# Patient Record
Sex: MALE | Race: OTHER | HISPANIC OR LATINO | URBAN - METROPOLITAN AREA
[De-identification: names, ages, dates, MRNs, and addresses within clinical notes are randomized per-mention and may not be internally consistent; named-entity substitution may affect disease eponyms.]

---

## 2024-09-04 ENCOUNTER — EMERGENCY (EMERGENCY)
Facility: HOSPITAL | Age: 24
LOS: 1 days | End: 2024-09-04
Attending: STUDENT IN AN ORGANIZED HEALTH CARE EDUCATION/TRAINING PROGRAM
Payer: SELF-PAY

## 2024-09-04 VITALS
RESPIRATION RATE: 18 BRPM | HEART RATE: 77 BPM | WEIGHT: 145.06 LBS | SYSTOLIC BLOOD PRESSURE: 151 MMHG | TEMPERATURE: 98 F | DIASTOLIC BLOOD PRESSURE: 74 MMHG | OXYGEN SATURATION: 98 %

## 2024-09-04 PROCEDURE — 99283 EMERGENCY DEPT VISIT LOW MDM: CPT

## 2024-09-04 PROCEDURE — 99053 MED SERV 10PM-8AM 24 HR FAC: CPT

## 2024-09-04 PROCEDURE — 71101 X-RAY EXAM UNILAT RIBS/CHEST: CPT | Mod: 26

## 2024-09-04 PROCEDURE — T1013: CPT

## 2024-09-04 PROCEDURE — 99284 EMERGENCY DEPT VISIT MOD MDM: CPT

## 2024-09-04 PROCEDURE — 71101 X-RAY EXAM UNILAT RIBS/CHEST: CPT

## 2024-09-04 RX ORDER — METHOCARBAMOL 750 MG/1
1 TABLET, FILM COATED ORAL
Qty: 15 | Refills: 0
Start: 2024-09-04

## 2024-09-04 RX ORDER — LIDOCAINE/BENZALKONIUM/ALCOHOL
1 SOLUTION, NON-ORAL TOPICAL ONCE
Refills: 0 | Status: COMPLETED | OUTPATIENT
Start: 2024-09-04 | End: 2024-09-04

## 2024-09-04 RX ORDER — IBUPROFEN 600 MG
600 TABLET ORAL ONCE
Refills: 0 | Status: COMPLETED | OUTPATIENT
Start: 2024-09-04 | End: 2024-09-04

## 2024-09-04 RX ORDER — METHOCARBAMOL 750 MG/1
1500 TABLET, FILM COATED ORAL ONCE
Refills: 0 | Status: COMPLETED | OUTPATIENT
Start: 2024-09-04 | End: 2024-09-04

## 2024-09-04 RX ORDER — IBUPROFEN 600 MG
1 TABLET ORAL
Qty: 20 | Refills: 0
Start: 2024-09-04

## 2024-09-04 RX ADMIN — METHOCARBAMOL 1500 MILLIGRAM(S): 750 TABLET, FILM COATED ORAL at 02:11

## 2024-09-04 RX ADMIN — Medication 600 MILLIGRAM(S): at 02:10

## 2024-09-04 RX ADMIN — Medication 1 PATCH: at 02:11
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
## 2024-09-04 NOTE — ED PROVIDER NOTE - PATIENT PORTAL LINK FT
You can access the FollowMyHealth Patient Portal offered by [facility] by registering at the following website: [email or web address]. By joining [employer] (ac[employer] by [employer] in 2014)’s FollowMyHealth portal, you will also be able to view your health information using other applications (apps) compatible with our system.
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
## 2024-09-04 NOTE — ED PROVIDER NOTE - PHYSICAL EXAMINATION
Gen: No acute distress, non toxic  HEENT: Mucous membranes moist, pink conjunctivae, PERRL, EOMI  CV: RRR, nl s1/s2.  Resp: CTAB, normal rate and effort  GI: Abdomen soft, non-tender to palpation, no rebound or guarding   : No CVAT  Neuro: A&O x 3, CNII-XII grossly intact, moving all 4 extremities  MSK: +Tenderness right lower anterior/lateral ribs. No abdominal tenderness. No midline spine tenderness to palpation. +Bilateral cervical paraspinal tenderness. 5/5 strength BUE/BLE, sensation intact throughout, 2+ distal pulses  Skin: No rashes. intact and perfused.

## 2025-01-19 ENCOUNTER — EMERGENCY (EMERGENCY)
Facility: HOSPITAL | Age: 25
LOS: 1 days | Discharge: HOME | End: 2025-01-19
Attending: STUDENT IN AN ORGANIZED HEALTH CARE EDUCATION/TRAINING PROGRAM
Payer: MEDICAID

## 2025-01-19 VITALS
HEART RATE: 107 BPM | RESPIRATION RATE: 18 BRPM | OXYGEN SATURATION: 97 % | WEIGHT: 149.91 LBS | DIASTOLIC BLOOD PRESSURE: 93 MMHG | SYSTOLIC BLOOD PRESSURE: 140 MMHG | TEMPERATURE: 98 F

## 2025-01-19 LAB — PCP SPEC-MCNC: SIGNIFICANT CHANGE UP

## 2025-01-19 PROCEDURE — 99285 EMERGENCY DEPT VISIT HI MDM: CPT

## 2025-01-19 PROCEDURE — 93010 ELECTROCARDIOGRAM REPORT: CPT

## 2025-01-19 RX ORDER — LORAZEPAM 1 MG/1
2 TABLET ORAL ONCE
Refills: 0 | Status: DISCONTINUED | OUTPATIENT
Start: 2025-01-19 | End: 2025-01-19

## 2025-01-19 RX ADMIN — LORAZEPAM 2 MILLIGRAM(S): 1 TABLET ORAL at 23:17

## 2025-01-19 NOTE — ED PROVIDER NOTE - CLINICAL SUMMARY MEDICAL DECISION MAKING FREE TEXT BOX
24yo M w/ cocaine use disorder presents to the ED for hallucinations, tachycardic, denies any homicidal or any suicidal ideation. will do medical screening labs and request one-to-one. +ativan 26yo M w/ cocaine use disorder presents to the ED for hallucinations, tachycardic, denies any homicidal or any suicidal ideation. will do medical screening labs and request one-to-one. +ativan    Patient received on sign out - seen and evaluated by telepsych.  Cleared for outpatient follow-up. Ramona Parisi MD Attending Physician-

## 2025-01-19 NOTE — ED ADULT TRIAGE NOTE - CHIEF COMPLAINT QUOTE
c/o of visual hallucinations. Denies SI/HI, auditory hallucinations, etoh/drug use. Spouse at bedside. Patient changed into yellow gown

## 2025-01-19 NOTE — ED PROVIDER NOTE - PHYSICAL EXAMINATION
Gen: well appearing, sitting up in bed, cooperative, +walking into other patient rooms during interview  Head: normocephalic, atraumatic  EENT: EOMI, moist mucous membranes, no scleral icterus, no JVD  Lung: no increased work of breathing, clear to auscultation bilaterally, no wheezing, rales, rhonchi, speaking in full sentences  CV: +tachycardic, regular rhythm, normal s1/s2, 2+ radial pulses bilaterally  Abd: soft, non-tender, non-distended,  no CVA tenderness  MSK: No edema, no visible deformities, full range of motion in all 4 extremities  Neuro: Awake, alert, no focal neurologic deficits  Skin: No obvious rash, no jaundice  Psych: normal affect, +concerned that people don't believe there are dead people hanging from backyard trees, "I have proof on my phone",

## 2025-01-19 NOTE — ED PROVIDER NOTE - PATIENT PORTAL LINK FT
You can access the FollowMyHealth Patient Portal offered by [facility] by registering at the following website: [email or web address]. By joining [employer]’s FollowMyHealth portal, you will also be able to view your health information using other applications (apps) compatible with our system.

## 2025-01-19 NOTE — ED PROVIDER NOTE - NSFOLLOWUPINSTRUCTIONS_ED_ALL_ED_FT
Physician Progress Note      Sasha Huerta  [facility] #:                  [id number]  :                       1960  ADMIT DATE:       2022 3:59 PM  [address] [place] DATE:        2022 8:30 PM  RESPONDING  PROVIDER #:        Mraia M Morin MD          QUERY TEXT:    Patient admitted  with seizures. Noted documentation of intubation for   airway protection in PNs ,  and  DC Summary and respiratory   failure in  Hospitalist PN and  Neurology PN. Please indicate one of   the following and document in the medical record: The medical record reflects the following:  Risk Factors: 64 y.o. male with seizures, CKD4, AMS, acidosis, DM, Opiod   dependence  Clinical Indicators: per  ED: Effort: Pulmonary effort is normal. No   respiratory distress. Due to concern for grossly altered mental status I did   intubate him for airway protection. Treatment: Intubation  Options provided:  -- Intubated for airway protection only, Acute Respiratory Failure ruled out   after study  -- Intubated for Acute Respiratory Failure as evidenced by, Please document   evidence. -- Other - I will add my own diagnosis  -- Disagree - Not applicable / Not valid  -- Disagree - Clinically unable to determine / Unknown  -- Refer to Clinical Documentation Reviewer    PROVIDER RESPONSE TEXT:    This patient was intubated for airway protection only, Acute Respiratory   Failure has been ruled out after study.     Query created by: Iain Muñoz on 2022 11:44 AM      Electronically signed by:  Maria M Morin MD 2022 1:52 PM Please follow-up with the resources provided to you by psychiatry.  Please return to the emergency department for hearing or seeing things that others are not, thoughts of harming yourself or others or if you get worse in any way

## 2025-01-19 NOTE — ED PROVIDER NOTE - ATTENDING CONTRIBUTION TO CARE
anxiety and hallucinations in setting of alcohol and cocaine abuse; anxious and restless, but cooperative; NAD, non toxic; normal heart and lung sounds; abd soft nt nd; no edema; +endorsing hallucinations; received ativan in ED; pending final BH eval    I personally saw the patient with the resident, and completed the key components of the history and physical exam. I then discussed the management plan with the resident.
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
## 2025-01-20 VITALS
DIASTOLIC BLOOD PRESSURE: 59 MMHG | OXYGEN SATURATION: 96 % | RESPIRATION RATE: 18 BRPM | TEMPERATURE: 98 F | HEART RATE: 93 BPM | SYSTOLIC BLOOD PRESSURE: 108 MMHG

## 2025-01-20 DIAGNOSIS — F15.951 OTHER STIMULANT USE, UNSPECIFIED WITH STIMULANT-INDUCED PSYCHOTIC DISORDER WITH HALLUCINATIONS: ICD-10-CM

## 2025-01-20 LAB
ALBUMIN SERPL ELPH-MCNC: 4.8 G/DL — SIGNIFICANT CHANGE UP (ref 3.3–5.2)
ALP SERPL-CCNC: 65 U/L — SIGNIFICANT CHANGE UP (ref 40–120)
ALT FLD-CCNC: 189 U/L — HIGH
AMPHET UR-MCNC: POSITIVE
ANION GAP SERPL CALC-SCNC: 19 MMOL/L — HIGH (ref 5–17)
APAP SERPL-MCNC: <3 UG/ML — LOW (ref 10–26)
APPEARANCE UR: CLEAR — SIGNIFICANT CHANGE UP
AST SERPL-CCNC: 55 U/L — HIGH
BARBITURATES UR SCN-MCNC: NEGATIVE — SIGNIFICANT CHANGE UP
BASOPHILS # BLD AUTO: 0.14 K/UL — SIGNIFICANT CHANGE UP (ref 0–0.2)
BASOPHILS NFR BLD AUTO: 0.9 % — SIGNIFICANT CHANGE UP (ref 0–2)
BENZODIAZ UR-MCNC: NEGATIVE — SIGNIFICANT CHANGE UP
BILIRUB SERPL-MCNC: 0.7 MG/DL — SIGNIFICANT CHANGE UP (ref 0.4–2)
BILIRUB UR-MCNC: NEGATIVE — SIGNIFICANT CHANGE UP
BUN SERPL-MCNC: 10.9 MG/DL — SIGNIFICANT CHANGE UP (ref 8–20)
CALCIUM SERPL-MCNC: 10.2 MG/DL — SIGNIFICANT CHANGE UP (ref 8.4–10.5)
CHLORIDE SERPL-SCNC: 96 MMOL/L — SIGNIFICANT CHANGE UP (ref 96–108)
CO2 SERPL-SCNC: 22 MMOL/L — SIGNIFICANT CHANGE UP (ref 22–29)
COCAINE METAB.OTHER UR-MCNC: NEGATIVE — SIGNIFICANT CHANGE UP
COLOR SPEC: YELLOW — SIGNIFICANT CHANGE UP
CREAT SERPL-MCNC: 1 MG/DL — SIGNIFICANT CHANGE UP (ref 0.5–1.3)
DIFF PNL FLD: NEGATIVE — SIGNIFICANT CHANGE UP
EGFR: 107 ML/MIN/1.73M2 — SIGNIFICANT CHANGE UP
EOSINOPHIL # BLD AUTO: 0.14 K/UL — SIGNIFICANT CHANGE UP (ref 0–0.5)
EOSINOPHIL NFR BLD AUTO: 0.9 % — SIGNIFICANT CHANGE UP (ref 0–6)
ETHANOL SERPL-MCNC: <10 MG/DL — SIGNIFICANT CHANGE UP (ref 0–9)
FENTANYL UR QL SCN: NEGATIVE — SIGNIFICANT CHANGE UP
GLUCOSE SERPL-MCNC: 92 MG/DL — SIGNIFICANT CHANGE UP (ref 70–99)
GLUCOSE UR QL: NEGATIVE MG/DL — SIGNIFICANT CHANGE UP
HCT VFR BLD CALC: 46.4 % — SIGNIFICANT CHANGE UP (ref 39–50)
HGB BLD-MCNC: 16.2 G/DL — SIGNIFICANT CHANGE UP (ref 13–17)
KETONES UR-MCNC: NEGATIVE MG/DL — SIGNIFICANT CHANGE UP
LEUKOCYTE ESTERASE UR-ACNC: NEGATIVE — SIGNIFICANT CHANGE UP
LYMPHOCYTES # BLD AUTO: 1.92 K/UL — SIGNIFICANT CHANGE UP (ref 1–3.3)
LYMPHOCYTES # BLD AUTO: 12.5 % — LOW (ref 13–44)
MANUAL SMEAR VERIFICATION: SIGNIFICANT CHANGE UP
MCHC RBC-ENTMCNC: 30.9 PG — SIGNIFICANT CHANGE UP (ref 27–34)
MCHC RBC-ENTMCNC: 34.9 G/DL — SIGNIFICANT CHANGE UP (ref 32–36)
MCV RBC AUTO: 88.4 FL — SIGNIFICANT CHANGE UP (ref 80–100)
METHADONE UR-MCNC: NEGATIVE — SIGNIFICANT CHANGE UP
MONOCYTES # BLD AUTO: 1.09 K/UL — HIGH (ref 0–0.9)
MONOCYTES NFR BLD AUTO: 7.1 % — SIGNIFICANT CHANGE UP (ref 2–14)
NEUTROPHILS # BLD AUTO: 11.4 K/UL — HIGH (ref 1.8–7.4)
NEUTROPHILS NFR BLD AUTO: 74.1 % — SIGNIFICANT CHANGE UP (ref 43–77)
NITRITE UR-MCNC: NEGATIVE — SIGNIFICANT CHANGE UP
OPIATES UR-MCNC: NEGATIVE — SIGNIFICANT CHANGE UP
PCP UR-MCNC: NEGATIVE — SIGNIFICANT CHANGE UP
PH UR: 6.5 — SIGNIFICANT CHANGE UP (ref 5–8)
PLAT MORPH BLD: NORMAL — SIGNIFICANT CHANGE UP
PLATELET # BLD AUTO: 304 K/UL — SIGNIFICANT CHANGE UP (ref 150–400)
POTASSIUM SERPL-MCNC: 3.4 MMOL/L — LOW (ref 3.5–5.3)
POTASSIUM SERPL-SCNC: 3.4 MMOL/L — LOW (ref 3.5–5.3)
PROT SERPL-MCNC: 7.7 G/DL — SIGNIFICANT CHANGE UP (ref 6.6–8.7)
PROT UR-MCNC: NEGATIVE MG/DL — SIGNIFICANT CHANGE UP
RBC # BLD: 5.25 M/UL — SIGNIFICANT CHANGE UP (ref 4.2–5.8)
RBC # FLD: 12.5 % — SIGNIFICANT CHANGE UP (ref 10.3–14.5)
RBC BLD AUTO: NORMAL — SIGNIFICANT CHANGE UP
SALICYLATES SERPL-MCNC: <0.6 MG/DL — LOW (ref 10–20)
SARS-COV-2 RNA SPEC QL NAA+PROBE: SIGNIFICANT CHANGE UP
SODIUM SERPL-SCNC: 137 MMOL/L — SIGNIFICANT CHANGE UP (ref 135–145)
SP GR SPEC: <1.005 — LOW (ref 1–1.03)
THC UR QL: NEGATIVE — SIGNIFICANT CHANGE UP
UROBILINOGEN FLD QL: 0.2 MG/DL — SIGNIFICANT CHANGE UP (ref 0.2–1)
VARIANT LYMPHS # BLD: 4.5 % — SIGNIFICANT CHANGE UP (ref 0–6)
WBC # BLD: 15.39 K/UL — HIGH (ref 3.8–10.5)
WBC # FLD AUTO: 15.39 K/UL — HIGH (ref 3.8–10.5)

## 2025-01-20 PROCEDURE — 99285 EMERGENCY DEPT VISIT HI MDM: CPT | Mod: 25

## 2025-01-20 PROCEDURE — 96372 THER/PROPH/DIAG INJ SC/IM: CPT

## 2025-01-20 PROCEDURE — T1013: CPT

## 2025-01-20 PROCEDURE — 90792 PSYCH DIAG EVAL W/MED SRVCS: CPT | Mod: 95

## 2025-01-20 PROCEDURE — 36415 COLL VENOUS BLD VENIPUNCTURE: CPT

## 2025-01-20 PROCEDURE — 80053 COMPREHEN METABOLIC PANEL: CPT

## 2025-01-20 PROCEDURE — 70450 CT HEAD/BRAIN W/O DYE: CPT | Mod: 26

## 2025-01-20 PROCEDURE — 87635 SARS-COV-2 COVID-19 AMP PRB: CPT

## 2025-01-20 PROCEDURE — 85025 COMPLETE CBC W/AUTO DIFF WBC: CPT

## 2025-01-20 PROCEDURE — 70450 CT HEAD/BRAIN W/O DYE: CPT | Mod: MC

## 2025-01-20 PROCEDURE — 93005 ELECTROCARDIOGRAM TRACING: CPT

## 2025-01-20 PROCEDURE — 80307 DRUG TEST PRSMV CHEM ANLYZR: CPT

## 2025-01-20 PROCEDURE — 81003 URINALYSIS AUTO W/O SCOPE: CPT

## 2025-01-20 RX ORDER — LORAZEPAM 1 MG/1
2 TABLET ORAL ONCE
Refills: 0 | Status: DISCONTINUED | OUTPATIENT
Start: 2025-01-20 | End: 2025-01-20

## 2025-01-20 RX ORDER — OLANZAPINE 15 MG/1
10 TABLET ORAL ONCE
Refills: 0 | Status: COMPLETED | OUTPATIENT
Start: 2025-01-20 | End: 2025-01-20

## 2025-01-20 RX ADMIN — LORAZEPAM 2 MILLIGRAM(S): 1 TABLET ORAL at 07:01

## 2025-01-20 RX ADMIN — OLANZAPINE 10 MILLIGRAM(S): 15 TABLET ORAL at 08:16

## 2025-01-20 NOTE — ED ADULT NURSE NOTE - OBJECTIVE STATEMENT
pt A & Ox4 brought in by family for visual hallucinations. Respirations even and unlabored. Denies chest pain or SOB. Pt reports hx of using cocaine, denies use today. Pt states he saw "dead people hanging from trees," believes people are after his wife and is actively hallucinating in ED. Pt in yellow gown, belongings secured, 1:1 in place, family at bedside. awaiting CT results for medical clearance.

## 2025-01-20 NOTE — ED BEHAVIORAL HEALTH ASSESSMENT NOTE - RISK ASSESSMENT
acute: utox +meth , depressed mood, unemployment  chroninc: cocaine use , etoh use  modifiable: intoxicant washout, medication management   protective: sense of responsibility to wife

## 2025-01-20 NOTE — ED BEHAVIORAL HEALTH PROGRESS NOTE - DETAILS
pt qasim prior and current sI/HI  he was provided psychoeducation on substance use and psychosis   he was advised to abstain from substances which can worsen his mental health   he understands can return to ED and/or utilize crisis hotlines should his symptoms worsen or return  Collteral was provided by father

## 2025-01-20 NOTE — ED BEHAVIORAL HEALTH PROGRESS NOTE - SUMMARY
Pt presented with psychosis-- AVH, paranoia and anxiety. He was RTIS and complained for insomnia x 3 days.

## 2025-01-20 NOTE — ED BEHAVIORAL HEALTH PROGRESS NOTE - INTERDISCIPLINARY MEETING HELD DETAILS FREE TEXT
Nursing Staff-- reprotes that pt has been calm and cooeprative throughout his period of pbsetvation. today he did not appear to be paranoid or delusional, RTIS.

## 2025-01-20 NOTE — ED BEHAVIORAL HEALTH PROGRESS NOTE - SAFETY PLAN ADDT'L DETAILS
Safety plan discussed with.../Education provided regarding environmental safety / lethal means restriction/Provision of National Suicide Prevention Lifeline 1-794-917-VAZH (8274)

## 2025-01-20 NOTE — ED BEHAVIORAL HEALTH NOTE - BEHAVIORAL HEALTH NOTE
========================     FOR EACH COLLATERAL     ========================     Collateral below has requested that the information provided remain confidential: Yes [  ] No [ x ]     Collateral below has provided information that patient is/may be unaware of: Yes [  ] No [  x]     Patient gives permission to obtain collateral from _____:     (  ) Yes     (x )  No     Rationale for overriding objection               (  ) Lack of capacity. Details: ________               ( x ) Assessing risk of danger to self/others. Details: ________     Rationale for selecting specific collateral source               ( x ) Potential to impact risk of danger to self/others and no alternative equivalent. Details: _____     NAME: Fabio      NUMBER: [phone]     RELATIONSHIP: Father      RELIABILITY: Reliable      COMMENTS: This writer contacted pt’s father, Fabio, to obtain collateral regarding patient's current presentation and relevant hx. Utilized  ID [id number] to facilitate collateral call.         Collateral does not report any acute safety concerns – no SI/SIB/SA current or past. Collateral interested in outpatient care if patient was to be psychiatrically cleared. Collateral expressed concern for the paranoid behaviors and the hallucinations, however denied any safety concerns.      ========================     PATIENT DEMOGRAPHICS:     ========================     HPI     BASELINE FUNCTIONING: Patient is a 25 y.o male, unemployed, domiciled with his wife (Aysha [phone]) brought into the ED after endorsing VH of dead bodies hanging from the trees.      DATE HPI STARTED: 1/19/2025      DECOMPENSATION: Per collateral, patient has no hx of AH/VH or SI/SIB/SA. Collateral reports that patient was expressing seeing dead bodies hanging from the trees at his home, and was also endorsing paranoid thoughts that he was being followed. Collateral reports this behavior was sudden and he has no hx of any similar episodes     SUICIDALITY: denied any past or current SI/SIB/SA     VIOLENCE: unknown      SUBSTANCE: Collateral reports pt has hx of using cocaine, however, states pt has not used cocaine since December. Collateral reports occasionally drinking, however, reports patient was at a party and was drinking alcohol prior to the onset of the VH      ========================     PAST PSYCHIATRIC HISTORY     ========================     DATE PAST PSYCHIATRIC HISTORY STARTED: no past psych hx      MAIN PSYCHIATRIC DIAGNOSIS: denied      PSYCHIATRIC HOSPITALIZATIONS: denied     PRIOR ILLNESS: denied     SUICIDALITY: denied     VIOLENCE: unknown      SUBSTANCE USE: history of cocaine use – collateral reports patient last used cocaine in December. Started using around 3 years ago      ==============     OTHER HISTORY     ==============     CURRENT MEDICATION: none      MEDICAL HISTORY: unknown      ALLERGIES unknown     LEGAL ISSUES: unknown     FIREARM ACCESS: denied      SOCIAL HISTORY: unknown     FAMILY HISTORY: unknown     DEVELOPMENTAL HISTORY:     unknown

## 2025-01-20 NOTE — ED BEHAVIORAL HEALTH PROGRESS NOTE - RISK ASSESSMENT
Pt  has not prior MH history or prior history of psychosis.  With rest and the continued resolution of his psychosis, he is currently back to his baseline level of risk, which is elevated above that of the general population due to his chronic cocaine use.      acute: recent episode of psychosis, low mod and anxiety   chronic: cocaine use   modifiable: employment, outpatient services   protective: family support, future orientation, openness to referrals/services

## 2025-01-20 NOTE — ED ADULT NURSE REASSESSMENT NOTE - NS ED NURSE REASSESS COMMENT FT1
pt is self isolating to his room resting/sleeping. No attempts to harm self or others. no acute distress noted.

## 2025-01-20 NOTE — ED BEHAVIORAL HEALTH PROGRESS NOTE - RISK MITIGATION STRATEGIES IMPLEMENTED DETAILS FREE TEXT
Discussed risk mitigation with patient , which includes abstaining from substances which may make his psychosis return: meth, cocaine , etoh ect.   Discussed addressing depression/anxiety and ongoing psychosocial stressors by folowing up on referrals and pursuing outpatient treatment  Education pt about returning to ED with continued or worsening symptoms   Educated pt about crisis hotlines

## 2025-01-20 NOTE — ED ADULT NURSE REASSESSMENT NOTE - NS ED NURSE REASSESS COMMENT FT1
pt continues to self isolate to his room with no attempts to harm self or others. awaiting to be re-evaluated.
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
## 2025-01-20 NOTE — ED BEHAVIORAL HEALTH PROGRESS NOTE - LACKING INFO FOR PSYCH DISPO DETAILS FREE TEXT
Pt presented with utox +ampehatamine and new onset AVH, paranoia . On reassessment he denies AVH, he does not appear to be RTIS or lookig suspiciously out the door as he was yesterday.

## 2025-01-20 NOTE — ED ADULT NURSE REASSESSMENT NOTE - DESCRIPTION
[facility] pt is ao Citizen of the [place] speaking brought in by father.  pt deies pmh, pph, denies drug use.  pt is having visual hallucinations.  while sitting on bed in room looking out stating he sees kids.  not seen by writer. telepsych called in consult started

## 2025-01-20 NOTE — ED BEHAVIORAL HEALTH PROGRESS NOTE - DETAILS:
Pt reports feeling much better today than he did yesterday. He denies  SI/HI and AVH. He says he was able to sleep well which has helped to improve his mood. He wants to go home.  Pt reports some anxiety about the AVH threatening his wife and is wondering about the pictures he took of the werewolves and people haning on trees in his phone, however he understands these experiences were likely drug induced and He has some insight to his psychosis yesterday being "just thoughts or illusions."  He understand  he can return to the ED if he experiences these disturbances again.  He feels safe to return home.
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
## 2025-01-20 NOTE — ED BEHAVIORAL HEALTH ASSESSMENT NOTE - NSPRESENTSXS_PSY_ALL_CORE
Depressed mood/Anhedonia/Psychosis/Hopelessness or despair/Global insomnia/Severe anxiety, agitation or panic

## 2025-01-20 NOTE — ED BEHAVIORAL HEALTH PROGRESS NOTE - CASE SUMMARY/FORMULATION (CLEARLY DOCUMENT RATIONALE FOR DISPOSITION CHANGE)
Pt is a 24 yo Male with a history of cocaine use that presented to ED with family due to paranoia, psychosis, anxiety and insomnia in the setting of his utox +meth. Pt was allowed time to for intoxicants to metabolize and provided with antipsychotics and benzo to help with psychosis, anxiety and sleep. Pt now reporting resolution of AVH. He does not appear to be floridly manic or psychotic or RTIS or internally preoccupied. His thoughts are liner and he denies SI/HI. Sho does not appear to be gravely disabled, feels safe to return home and is able to engage in a reasonable conversation about safety planning and ongoing care. AS such, he does not meet criteria for an involuntary psychiatric hospitalization and can be safely discharged to pursue ongoing treatment in the outpatient setting.

## 2025-01-20 NOTE — ED BEHAVIORAL HEALTH PROGRESS NOTE - NEEDS SAFE DISCHARGE PLAN OTHER FREE TEXT
Discussed risk mitigation with patient , which includes abstaining from substances which may make his psychosis return: meth, cocaine , etoh ect.

## 2025-01-20 NOTE — ED ADULT NURSE NOTE - NSFALLUNIVINTERV_ED_ALL_ED
Bed/Stretcher in lowest position, wheels locked, appropriate side rails in place/Call bell, personal items and telephone in reach/Instruct patient to call for assistance before getting out of bed/chair/stretcher/Non-slip footwear applied when patient is off stretcher/Gregory to call system/Physically safe environment - no spills, clutter or unnecessary equipment/Purposeful proactive rounding/Room/bathroom lighting operational, light cord in reach

## 2025-02-14 NOTE — ED ADULT TRIAGE NOTE - AS TEMP SITE
Photo Preface (Leave Blank If You Do Not Want): Photographs were obtained today
Detail Level: Zone
oral